# Patient Record
Sex: FEMALE
[De-identification: names, ages, dates, MRNs, and addresses within clinical notes are randomized per-mention and may not be internally consistent; named-entity substitution may affect disease eponyms.]

---

## 2019-02-02 ENCOUNTER — HOSPITAL ENCOUNTER (INPATIENT)
Dept: HOSPITAL 5 - ED | Age: 49
LOS: 3 days | Discharge: HOME HEALTH | DRG: 641 | End: 2019-02-05
Attending: INTERNAL MEDICINE | Admitting: INTERNAL MEDICINE
Payer: SELF-PAY

## 2019-02-02 DIAGNOSIS — E66.9: ICD-10-CM

## 2019-02-02 DIAGNOSIS — Z88.0: ICD-10-CM

## 2019-02-02 DIAGNOSIS — Z80.0: ICD-10-CM

## 2019-02-02 DIAGNOSIS — Z80.8: ICD-10-CM

## 2019-02-02 DIAGNOSIS — Z82.49: ICD-10-CM

## 2019-02-02 DIAGNOSIS — E87.6: ICD-10-CM

## 2019-02-02 DIAGNOSIS — Z80.1: ICD-10-CM

## 2019-02-02 DIAGNOSIS — E83.42: Primary | ICD-10-CM

## 2019-02-02 DIAGNOSIS — M62.838: ICD-10-CM

## 2019-02-02 LAB
BASOPHILS # (AUTO): 0.1 K/MM3 (ref 0–0.1)
BASOPHILS NFR BLD AUTO: 0.9 % (ref 0–1.8)
BUN SERPL-MCNC: 20 MG/DL (ref 7–17)
BUN/CREAT SERPL: 50 %
CALCIUM SERPL-MCNC: 8 MG/DL (ref 8.4–10.2)
EOSINOPHIL # BLD AUTO: 0.1 K/MM3 (ref 0–0.4)
EOSINOPHIL NFR BLD AUTO: 1.2 % (ref 0–4.3)
HCT VFR BLD CALC: 32.8 % (ref 30.3–42.9)
HEMOLYSIS INDEX: 9
HGB BLD-MCNC: 10.9 GM/DL (ref 10.1–14.3)
LYMPHOCYTES # BLD AUTO: 3.4 K/MM3 (ref 1.2–5.4)
LYMPHOCYTES NFR BLD AUTO: 39.6 % (ref 13.4–35)
MCHC RBC AUTO-ENTMCNC: 34 % (ref 30–34)
MCV RBC AUTO: 86 FL (ref 79–97)
MONOCYTES # (AUTO): 0.8 K/MM3 (ref 0–0.8)
MONOCYTES % (AUTO): 8.8 % (ref 0–7.3)
PLATELET # BLD: 314 K/MM3 (ref 140–440)
RBC # BLD AUTO: 3.81 M/MM3 (ref 3.65–5.03)

## 2019-02-02 PROCEDURE — 83735 ASSAY OF MAGNESIUM: CPT

## 2019-02-02 PROCEDURE — 36415 COLL VENOUS BLD VENIPUNCTURE: CPT

## 2019-02-02 PROCEDURE — 93005 ELECTROCARDIOGRAM TRACING: CPT

## 2019-02-02 PROCEDURE — 82550 ASSAY OF CK (CPK): CPT

## 2019-02-02 PROCEDURE — 70450 CT HEAD/BRAIN W/O DYE: CPT

## 2019-02-02 PROCEDURE — 93010 ELECTROCARDIOGRAM REPORT: CPT

## 2019-02-02 PROCEDURE — A9577 INJ MULTIHANCE: HCPCS

## 2019-02-02 PROCEDURE — 70553 MRI BRAIN STEM W/O & W/DYE: CPT

## 2019-02-02 PROCEDURE — 80048 BASIC METABOLIC PNL TOTAL CA: CPT

## 2019-02-02 PROCEDURE — 85025 COMPLETE CBC W/AUTO DIFF WBC: CPT

## 2019-02-02 RX ADMIN — MORPHINE SULFATE PRN MG: 2 INJECTION, SOLUTION INTRAMUSCULAR; INTRAVENOUS at 10:42

## 2019-02-02 RX ADMIN — Medication SCH ML: at 22:32

## 2019-02-02 RX ADMIN — MORPHINE SULFATE PRN MG: 2 INJECTION, SOLUTION INTRAMUSCULAR; INTRAVENOUS at 17:21

## 2019-02-02 NOTE — HISTORY AND PHYSICAL REPORT
History of Present Illness


Date of examination: 02/02/19


Date of admission: 


02/02/19 08:57





Chief complaint: 


painful muscle spasms





History of present illness: 


Patient is 49 yo obese. She presents with severe spasms. she states she has had 

episodes for more than 1 year. Initially spasms started on right lower ext then 

to left lower extremity now to right upper extremity. She describes pain as very

shap, 10 out of 10. This has been going on and off for more than 1 year and her 

PCP, Dr. De Leon had referred her to see neurologist but appointment not due 

yet. She denies any weakness. she denies headache. She was evaluated in 

Emergency Dept. a CT head was done, was unremarkable. Will admit for further 

evaluation.








Past History


Past Medical History: other (Attention deficit disorder)


Past Surgical History: No surgical history


Social history: , lives with family.  denies: smoking, alcohol abuse


Family history: cancer (Father has lung cancer, MI.   mother colon ca. brother 

has cancer)





Medications and Allergies


                                    Allergies











Allergy/AdvReac Type Severity Reaction Status Date / Time


 


Penicillins Allergy  Anaphylaxis Verified 02/02/19 03:31











                                Home Medications











 Medication  Instructions  Recorded  Confirmed  Last Taken  Type


 


HYDROcodone/APAP 5-325 [[place] 1 each PO Q6HR PRN #12 tablet 02/02/19  Unknown Rx





5/325]     











Active Meds: 


Active Medications





Morphine Sulfate (Morphine)  2 mg IV Q4H PRN


   PRN Reason: Pain, Moderate (4-6)


   Last Admin: 02/02/19 10:42 Dose:  2 mg


   Documented by: 











Review of Systems


All systems: negative (No fever, no cough, No aabdominal pain, no weakness. All 

other systems reviewed and are negative)





Exam





- Physical Exam


Narrative exam: 


GEN: Not in acute distress, lying in bed, obese


HEENT: Normocephalic, atraumatic, 


Neck: supple, No JVD


Lungs: Clear to auscultation bilaterally, no wheeze


Heart:S1 and S2 regular, no murmurs, rubs or gallop,  


Abd:soft, non tender, non distended, normal bowel sounds


Ext: No edema, no clubbing or cyanosis


Neuro: Awake,alert, oriented x 3, moves all ext, muscle power 5/5











- Constitutional


Vitals: 


                                        











Temp Pulse Resp BP Pulse Ox


 


 97.9 F   71   10 L  100/53   97 


 


 02/02/19 03:49  02/02/19 07:05  02/02/19 07:05  02/02/19 07:05  02/02/19 06:00














Results





- Labs


CBC & Chem 7: 


                                 02/03/19 03:10





                                 02/04/19 05:37


Labs: 


                              Abnormal lab results











  02/02/19 02/02/19 Range/Units





  04:04 04:04 


 


Lymph % (Auto)  39.6 H   (13.4-35.0)  %


 


Mono % (Auto)  8.8 H   (0.0-7.3)  %


 


Potassium   3.1 L  (3.6-5.0)  mmol/L


 


Chloride   111.1 H  ()  mmol/L


 


Carbon Dioxide   19 L  (22-30)  mmol/L


 


BUN   20 H  (7-17)  mg/dL


 


Creatinine   0.4 L  (0.7-1.2)  mg/dL


 


Calcium   8.0 L  (8.4-10.2)  mg/dL


 


Magnesium   1.50 L  (1.7-2.3)  mg/dL














Assessment and Plan


Painful muscle spasms involving all extremities.


This has become worse past few days


Admit to med/surg


Obtain MRI Brain 


Neurochecks





History of ADD


was on adderall





Full code status

## 2019-02-02 NOTE — CAT SCAN REPORT
FINAL REPORT



EXAM:  CT HEAD/BRAIN WO CON



HISTORY:  dizziness 



TECHNIQUE:  Routine axial imaging was obtained of the brain without IV contrast. There are no previou
s studies available for comparison.



FINDINGS:  

There is no evidence of acute stroke or hemorrhage. The ventricular system is appropriate in size and
 is symmetric. The basal cisterns appear normal. The mastoid air cells are well pneumatized. The visu
alized sinuses are clear. The calvarium appears intact.



IMPRESSION:  

No acute intracranial process.

## 2019-02-02 NOTE — CONSULTATION
HISTORY OF PRESENT ILLNESS:  This is a 48-year-old female that presents to

[facility] with new onset of focal spasms of legs with

associated severe pain during which time she is conscious, did have the

opportunity to review over her video of her episode, which she is in intense

pain, verbalizing the severity of the pain and she had spasms of arms and legs,

which appeared to be almost tetanic and tonic and she is yelling during this. 

She cannot stand or walk, does not affect her speech, seems go from right to

left arm and right to left leg.  She does not have bowel or bladder

incontinence.



PAST MEDICAL HISTORY:  Unremarkable for prior workup, although her family

medical doctor did treat her Zanaflex with minimal response.



PHYSICAL EXAMINATION:

VITAL SIGNS:  The patient at this point is completely alert.  She seems in some

degree of distress.  She is hyperventilating slightly.  She is obviously very

nervous and agitated.

NEUROLOGIC:  Cranial nerves are intact.  Speech is otherwise clear.  

strength is equal.  No seizure activity is noted, although she does seem to have

increased rigidity of the right arm.  Cranial nerves are intact.



IMPRESSION:  Based on the description and going over the cell phone video this

appears to be a tetanic spasm, which a course can occur in a variety of

conditions, I do not think ALS is a tenable diagnosis, MS is to be ruled out

given the patient's stated age of 48 the fact she is a female.



PLAN:  To further assess the condition.  She will need MRI scan of the spine and

brain.





DD: 02/02/2019 11:01

DT: 02/02/2019 11:27

JOB# [id number]  [id number]

JAHAIRA/EUNICE

## 2019-02-02 NOTE — EMERGENCY DEPARTMENT REPORT
HPI





- General


Chief Complaint: Neck Pain/Injury


Time Seen by Provider: 02/02/19 03:36





- HPI


HPI: 





48-year-old  female presents to the emergency department from home with

complaint of pain to the bilateral legs, the right arm and the neck consistent 

with severe muscle spasms.  The patient says that she has been dealing with this

over the past 1.5 years.  However she says that it started off just in one leg 

and has progressively worsened.  Today the pain started in the leg, then went 

upper back, and now has caused her right upper extremity to be contracted and 

spasmodic.  The patient is due to see a neurologist on 2/18/19, sent by her PCP 

Dr. De Leon, for further evaluation of these symptoms and the patient says also 

to be tested for ALS.  She has a past medical history of ADD for which he takes 

daily Adderall. She took a Zanaflex earlier for her symptoms without any relief.







ED Past Medical Hx





- Past Medical History


Previous Medical History?: Yes


Additional medical history: leg cramping





- Surgical History


Past Surgical History?: Yes


Additional Surgical History: Knee , hysterectomy





- Social History


Smoking Status: Never Smoker


Substance Use Type: None





- Medications


Home Medications: 


                                Home Medications











 Medication  Instructions  Recorded  Confirmed  Last Taken  Type


 


HYDROcodone/APAP 5-325 [[place] 1 each PO Q6HR PRN #12 tablet 02/02/19  Unknown Rx





5/325]     














ED Review of Systems


ROS: 


Stated complaint: NEURO SYMPTOMS


Other details as noted in HPI





Comment: All other systems reviewed and negative


Constitutional: denies: chills, fever


Eyes: denies: eye pain, vision change


ENT: denies: ear pain, throat pain


Respiratory: denies: cough, shortness of breath


Cardiovascular: denies: chest pain, palpitations


Gastrointestinal: denies: nausea, vomiting


Genitourinary: denies: dysuria, discharge


Musculoskeletal: arthralgia, myalgia


Skin: denies: rash, lesions


Neurological: denies: weakness, numbness





Physical Exam





- Physical Exam


Vital Signs: 


                                   Vital Signs











  02/02/19





  03:27


 


Temperature 98.1 F


 


Pulse Rate 87


 


Respiratory 20





Rate 


 


Blood Pressure 147/99


 


O2 Sat by Pulse 100





Oximetry 











Physical Exam: 





GENERAL: Patient presents yelling in pain.


HEENT: Normocephalic.  Atraumatic.   Patient has moist mucous membranes.


EYES:  Extraocular motions are intact.  Pupils are equal and reactive to light 

bilaterally.


NECK: Supple.  Trachea is midline.  Patient has what appears to be a spasmodic 

left-sided sidebend to the neck.


CHEST/LUNGS: Clear to auscultation.  There is no respiratory distress noted.  


HEART/CARDIOVASCULAR: Regular.  There is no tachycardia.  There is no obvious 

murmur.


ABDOMEN: Abdomen is soft, nontender.  Patient has normal bowel sounds.  There is

no abdominal distention.


SKIN: Skin is warm and dry.


NEURO: The patient is awake, alert, and oriented.  The patient is cooperative.  

The patient has no focal neurologic deficits.  The patient has normal speech.


MUSCULOSKELETAL: The patient's right upper extremity is spasming and contracted 

up against her chest and she is unable to relax the extremity.  Radial pulse +2 

over 4 and capillary refill less than 2 seconds to the affected right upper 

extremity.  





ED Course


                                   Vital Signs











  02/02/19





  03:27


 


Temperature 98.1 F


 


Pulse Rate 87


 


Respiratory 20





Rate 


 


Blood Pressure 147/99


 


O2 Sat by Pulse 100





Oximetry 














ED Medical Decision Making





- Lab Data


Result diagrams: 


                                 02/02/19 04:04





                                 02/02/19 04:04





- EKG Data


-: EKG Interpreted by Me


EKG shows normal: sinus rhythm, axis, intervals, QRS complexes, ST-T waves


Rate: normal





- EKG Data


When compared to previous EKG there are: previous EKG unavailable


Interpretation: normal EKG





- Radiology Data


Radiology results: report reviewed





EXAM: CT HEAD/BRAIN WO CON 





HISTORY: dizziness 





TECHNIQUE: Routine axial imaging was obtained of the brain without IV contrast. 

There are no 


previous studies available for comparison. 





FINDINGS: 


There is no evidence of acute stroke or hemorrhage. The ventricular system is 

appropriate in size 


and is symmetric. The basal cisterns appear normal. The mastoid air cells are 

well pneumatized. The 


visualized sinuses are clear. The calvarium appears intact. 





IMPRESSION: 


No acute intracranial process. 











Transcribed By: RB 


Dictated By: HERACLIO CALVO MD 


Electronically Authenticated By: HERACLIO CALVO MD 


Signed Date/Time: 02/02/19 0754 








- Medical Decision Making





This patient presents with a complaint of spasmodic pain that started in the 

legs and lower back and then moved up to the right arm and her neck.  She 

presents yelling in pain with the right arm contracted up against the chest and 

the neck and a left-sided sidebend.  An IV was placed and the patient was given 

some IV fluid resuscitation and pain medication.  This allowed her to finally 

relax and the contracture and neck sidebend resolved.  Patient's labs showed 

hypokalemia and hypomagnesemia and both of these electrolytes were replaced.  

Patient was evaluated multiple times over multiple hours and did appear to be 

improving.  However in my attempt to ambulate the patient, she complained of 

feeling unstable and having some blurry vision.  At this point a CT scan of the 

head without contrast was done that did not show any signs of any acute bleed, 

shift, mass, ischemia, or any other acute process.  Given her ED course and 

these current symptoms, the patient will be admitted to the hospital for further

evaluation and was accepted for admission by the hospitalist service.





- Differential Diagnosis


muscle spasm, tetany, MS, fibromyalgia


Critical Care Time: No


Critical care attestation.: 


If time is entered above; I have spent that time in minutes in the direct care 

of this critically ill patient, excluding procedure time.








ED Disposition


Clinical Impression: 


 Muscle spasm, Muscle spasticity, Musculoskeletal pain, Hypokalemia, H

ypomagnesemia, Unstable gait, Blurred vision





Disposition: DC-09 OP ADMIT IP TO THIS HOSP


Is pt being admited?: No


Condition: Stable


Time of Disposition: 06:53

## 2019-02-02 NOTE — PROGRESS NOTE
Subjective


Date of service: 02/02/19


Interval history: 


seen in the ED slurred speech and unsteady gait  severe leg wekness,,,, plan 

check MRI  reviewed all labs and wenrt over hx








Objective





- Vital Sign


                               Vital Signs - 12hr











  02/02/19 02/02/19 02/02/19





  03:27 03:49 04:00


 


Temperature 98.1 F 97.9 F 


 


Pulse Rate 87 80 


 


Respiratory 20 20 





Rate   


 


Blood Pressure 147/99  113/66


 


Blood Pressure  148/82 





[Left]   


 


O2 Sat by Pulse 100 99 98





Oximetry   














  02/02/19 02/02/19 02/02/19





  05:00 06:00 07:05


 


Temperature   


 


Pulse Rate   71


 


Respiratory   10 L





Rate   


 


Blood Pressure 123/62 100/53 100/53


 


Blood Pressure   





[Left]   


 


O2 Sat by Pulse 97 97 





Oximetry   














- Laboratory Findings


CBC and BMP: 


                                 02/02/19 04:04





                                 02/02/19 04:04


Abnormal Lab Findings: 


                                  Abnormal Labs











  02/02/19 02/02/19





  04:04 04:04


 


Lymph % (Auto)  39.6 H 


 


Mono % (Auto)  8.8 H 


 


Potassium   3.1 L


 


Chloride   111.1 H


 


Carbon Dioxide   19 L


 


BUN   20 H


 


Creatinine   0.4 L


 


Calcium   8.0 L


 


Magnesium   1.50 L

## 2019-02-03 LAB
BASOPHILS # (AUTO): 0 K/MM3 (ref 0–0.1)
BASOPHILS NFR BLD AUTO: 0.4 % (ref 0–1.8)
BUN SERPL-MCNC: 18 MG/DL (ref 7–17)
BUN/CREAT SERPL: 36 %
CALCIUM SERPL-MCNC: 8.6 MG/DL (ref 8.4–10.2)
EOSINOPHIL # BLD AUTO: 0.2 K/MM3 (ref 0–0.4)
EOSINOPHIL NFR BLD AUTO: 2.7 % (ref 0–4.3)
HCT VFR BLD CALC: 34 % (ref 30.3–42.9)
HEMOLYSIS INDEX: 6
HGB BLD-MCNC: 11.4 GM/DL (ref 10.1–14.3)
LYMPHOCYTES # BLD AUTO: 2.6 K/MM3 (ref 1.2–5.4)
LYMPHOCYTES NFR BLD AUTO: 32.5 % (ref 13.4–35)
MCHC RBC AUTO-ENTMCNC: 34 % (ref 30–34)
MCV RBC AUTO: 86 FL (ref 79–97)
MONOCYTES # (AUTO): 0.6 K/MM3 (ref 0–0.8)
MONOCYTES % (AUTO): 7.9 % (ref 0–7.3)
PLATELET # BLD: 296 K/MM3 (ref 140–440)
RBC # BLD AUTO: 3.97 M/MM3 (ref 3.65–5.03)

## 2019-02-03 RX ADMIN — MORPHINE SULFATE PRN MG: 2 INJECTION, SOLUTION INTRAMUSCULAR; INTRAVENOUS at 09:40

## 2019-02-03 RX ADMIN — MORPHINE SULFATE PRN MG: 2 INJECTION, SOLUTION INTRAMUSCULAR; INTRAVENOUS at 06:24

## 2019-02-03 RX ADMIN — TIZANIDINE PRN MG: 4 TABLET ORAL at 10:48

## 2019-02-03 RX ADMIN — TIZANIDINE PRN MG: 4 TABLET ORAL at 19:16

## 2019-02-03 RX ADMIN — MORPHINE SULFATE PRN MG: 2 INJECTION, SOLUTION INTRAMUSCULAR; INTRAVENOUS at 14:25

## 2019-02-03 RX ADMIN — MORPHINE SULFATE PRN MG: 2 INJECTION, SOLUTION INTRAMUSCULAR; INTRAVENOUS at 21:47

## 2019-02-03 RX ADMIN — Medication SCH ML: at 10:47

## 2019-02-03 RX ADMIN — Medication SCH ML: at 21:46

## 2019-02-03 NOTE — PROGRESS NOTE
Subjective


Date of service: 02/03/19


Interval history: 


PATIENT SEEN AND i THOROUGHLY REVIEWED THE CELL PHONE VIDEO... THIS COULD BE MS 

OR ELECTROLYTE DISORDER- POTASSIUM AND MAGBNESIUM  mri OF THE SPINE BEING 

REVIEWED





SPOKE TO DR. KUMAR PERSONALLY








Objective





- Vital Sign


                               Vital Signs - 12hr











  02/02/19 02/03/19





  21:55 05:16


 


Temperature 98.4 F 98.6 F


 


Pulse Rate 83 77


 


Respiratory 20 20





Rate  


 


Blood Pressure 106/56 145/74


 


O2 Sat by Pulse 92 96





Oximetry  














- Laboratory Findings


CBC and BMP: 


                                 02/03/19 03:10





                                 02/03/19 03:10


Abnormal Lab Findings: 


                                  Abnormal Labs











  02/02/19 02/02/19 02/03/19





  04:04 04:04 03:10


 


Lymph % (Auto)  39.6 H  


 


Mono % (Auto)  8.8 H   7.9 H


 


Potassium   3.1 L 


 


Chloride   111.1 H 


 


Carbon Dioxide   19 L 


 


BUN   20 H 


 


Creatinine   0.4 L 


 


Glucose   


 


Calcium   8.0 L 


 


Magnesium   1.50 L 














  02/03/19





  03:10


 


Lymph % (Auto) 


 


Mono % (Auto) 


 


Potassium 


 


Chloride 


 


Carbon Dioxide 


 


BUN  18 H


 


Creatinine  0.5 L


 


Glucose  119 H


 


Calcium 


 


Magnesium

## 2019-02-03 NOTE — PROGRESS NOTE
Assessment and Plan


Assessment and plan: 


Painful muscle spasms involving all extremities.


This has become worse past few days


Admitted to med/surg


Awaiting MRI Brain 


Neurochecks


patient was evaluated by neurologist





History of ADD


was on adderall





Full code status








History


Interval history: 


Painful muscle spasms








Hospitalist Physical





- Physical exam


Narrative exam: 


GEN: Not in acute distress, lying in bed, obese


HEENT: Normocephalic, atraumatic, 


Neck: supple, No JVD


Lungs: Clear to auscultation bilaterally, no wheeze


Heart:S1 and S2 regular, no murmurs, rubs or gallop,  


Abd:soft, non tender, non distended, normal bowel sounds


Ext: No edema, no clubbing or cyanosis


Neuro: Awake,alert, oriented x 3, moves all ext, muscle power 5/5











- Constitutional


Vitals: 


                                        











Temp Pulse Resp BP Pulse Ox


 


 98.6 F   77   20   145/74   96 


 


 02/03/19 05:16  02/03/19 05:16  02/03/19 05:16  02/03/19 05:16  02/03/19 05:16














Results





- Labs


CBC & Chem 7: 


                                 02/03/19 03:10





                                 02/04/19 05:37


Labs: 


                             Laboratory Last Values











WBC  7.9 K/mm3 (4.5-11.0)   02/03/19  03:10    


 


RBC  3.97 M/mm3 (3.65-5.03)   02/03/19  03:10    


 


Hgb  11.4 gm/dl (10.1-14.3)   02/03/19  03:10    


 


Hct  34.0 % (30.3-42.9)   02/03/19  03:10    


 


MCV  86 fl (79-97)   02/03/19  03:10    


 


MCH  29 pg (28-32)   02/03/19  03:10    


 


MCHC  34 % (30-34)   02/03/19  03:10    


 


RDW  14.5 % (13.2-15.2)   02/03/19  03:10    


 


Plt Count  296 K/mm3 (140-440)   02/03/19  03:10    


 


Lymph % (Auto)  32.5 % (13.4-35.0)   02/03/19  03:10    


 


Mono % (Auto)  7.9 % (0.0-7.3)  H  02/03/19  03:10    


 


Eos % (Auto)  2.7 % (0.0-4.3)   02/03/19  03:10    


 


Baso % (Auto)  0.4 % (0.0-1.8)   02/03/19  03:10    


 


Lymph #  2.6 K/mm3 (1.2-5.4)   02/03/19  03:10    


 


Mono #  0.6 K/mm3 (0.0-0.8)   02/03/19  03:10    


 


Eos #  0.2 K/mm3 (0.0-0.4)   02/03/19  03:10    


 


Baso #  0.0 K/mm3 (0.0-0.1)   02/03/19  03:10    


 


Seg Neutrophils %  56.5 % (40.0-70.0)   02/03/19  03:10    


 


Seg Neutrophils #  4.5 K/mm3 (1.8-7.7)   02/03/19  03:10    


 


Sodium  138 mmol/L (137-145)   02/03/19  03:10    


 


Potassium  4.1 mmol/L (3.6-5.0)  D 02/03/19  03:10    


 


Chloride  104.7 mmol/L ()   02/03/19  03:10    


 


Carbon Dioxide  22 mmol/L (22-30)   02/03/19  03:10    


 


Anion Gap  15 mmol/L  02/03/19  03:10    


 


BUN  18 mg/dL (7-17)  H  02/03/19  03:10    


 


Creatinine  0.5 mg/dL (0.7-1.2)  L  02/03/19  03:10    


 


Estimated GFR  > 60 ml/min  02/03/19  03:10    


 


BUN/Creatinine Ratio  36 %  02/03/19  03:10    


 


Glucose  119 mg/dL ()  H  02/03/19  03:10    


 


Calcium  8.6 mg/dL (8.4-10.2)   02/03/19  03:10    


 


Magnesium  1.50 mg/dL (1.7-2.3)  L  02/02/19  04:04    


 


Total Creatine Kinase  69 units/L ()   02/02/19  04:04

## 2019-02-04 LAB
BUN SERPL-MCNC: 13 MG/DL (ref 7–17)
BUN/CREAT SERPL: 33 %
CALCIUM SERPL-MCNC: 9.5 MG/DL (ref 8.4–10.2)
HEMOLYSIS INDEX: 1

## 2019-02-04 RX ADMIN — MORPHINE SULFATE PRN MG: 2 INJECTION, SOLUTION INTRAMUSCULAR; INTRAVENOUS at 05:04

## 2019-02-04 RX ADMIN — MORPHINE SULFATE PRN MG: 2 INJECTION, SOLUTION INTRAMUSCULAR; INTRAVENOUS at 09:10

## 2019-02-04 RX ADMIN — Medication SCH ML: at 22:46

## 2019-02-04 RX ADMIN — TIZANIDINE PRN MG: 4 TABLET ORAL at 16:12

## 2019-02-04 RX ADMIN — MORPHINE SULFATE PRN MG: 2 INJECTION, SOLUTION INTRAMUSCULAR; INTRAVENOUS at 15:00

## 2019-02-04 RX ADMIN — ENOXAPARIN SODIUM SCH MG: 100 INJECTION SUBCUTANEOUS at 09:11

## 2019-02-04 RX ADMIN — MORPHINE SULFATE PRN MG: 2 INJECTION, SOLUTION INTRAMUSCULAR; INTRAVENOUS at 23:12

## 2019-02-04 RX ADMIN — Medication SCH ML: at 09:11

## 2019-02-04 NOTE — PROGRESS NOTE
Subjective


Date of service: 02/04/19


Interval history: 


my first view of the MRI is very unremasrkable  see no clear evidence of MS 

suspect mag and potassium responsible








Objective





- Vital Sign


                               Vital Signs - 12hr











  02/04/19 02/04/19





  05:09 12:35


 


Temperature 97.7 F 98.0 F


 


Pulse Rate 79 81


 


Respiratory 18 18





Rate  


 


Blood Pressure 133/86 115/71


 


O2 Sat by Pulse 94 95





Oximetry  














- Laboratory Findings


CBC and BMP: 


                                 02/03/19 03:10





                                 02/04/19 05:37


Abnormal Lab Findings: 


                                  Abnormal Labs











  02/02/19 02/02/19 02/03/19





  04:04 04:04 03:10


 


Lymph % (Auto)  39.6 H  


 


Mono % (Auto)  8.8 H   7.9 H


 


Potassium   3.1 L 


 


Chloride   111.1 H 


 


Carbon Dioxide   19 L 


 


BUN   20 H 


 


Creatinine   0.4 L 


 


Glucose   


 


Calcium   8.0 L 


 


Magnesium   1.50 L 














  02/03/19 02/04/19





  03:10 05:37


 


Lymph % (Auto)  


 


Mono % (Auto)  


 


Potassium  


 


Chloride  


 


Carbon Dioxide  


 


BUN  18 H 


 


Creatinine  0.5 L  0.4 L


 


Glucose  119 H 


 


Calcium  


 


Magnesium

## 2019-02-04 NOTE — MAGNETIC RESONANCE REPORT
FINAL REPORT



PROCEDURE:  MRI brain without and with contrast. 



TECHNIQUE:  Magnetic resonance imaging of the brain was performed before and after the IV injection o
f paramagnetic contrast. 



HISTORY:  Unsteady gait, spasms. 



COMPARISON:  No prior studies are available for comparison.



FINDINGS:  

The ventricles are normal in size. There is normal signal intensity from the gray matter and white ma
tter. There are no mass lesions. There is no intracranial hemorrhage. There are no signs of restricte
d diffusion. There are no areas of abnormal contrast enhancement. The mastoid air cells and paranasal
 sinuses are well aerated. 



IMPRESSION:  

Normal study.

## 2019-02-04 NOTE — PROGRESS NOTE
Assessment and Plan


Assessment and plan: 


Painful muscle spasms involving all extremities.


This has become worse past few day


MRI Brain normal


Neurochecks


PT eval ordered.





History of ADD


was on adderall





Full code status





Likely dc home tomorrow after PT eval to follow with her PCP ansd Neuro 

outpatient.














History


Interval history: 


Still having painful muscle spasms








Hospitalist Physical





- Physical exam


Narrative exam: 


GEN: Not in acute distress, lying in bed, obese


HEENT: Normocephalic, atraumatic, 


Neck: supple, No JVD


Lungs: Clear to auscultation bilaterally, no wheeze


Heart:S1 and S2 regular, no murmurs, rubs or gallop,  


Abd:soft, non tender, non distended, normal bowel sounds


Ext: No edema, no clubbing or cyanosis


Neuro: Awake,alert, oriented x 3, moves all ext, muscle power 5/5











- Constitutional


Vitals: 


                                        











Temp Pulse Resp BP Pulse Ox


 


 98.0 F   81   18   115/71   95 


 


 02/04/19 12:35  02/04/19 12:35  02/04/19 12:35  02/04/19 12:35  02/04/19 12:35














Results





- Labs


CBC & Chem 7: 


                                 02/03/19 03:10





                                 02/04/19 05:37


Labs: 


                             Laboratory Last Values











WBC  7.9 K/mm3 (4.5-11.0)   02/03/19  03:10    


 


RBC  3.97 M/mm3 (3.65-5.03)   02/03/19  03:10    


 


Hgb  11.4 gm/dl (10.1-14.3)   02/03/19  03:10    


 


Hct  34.0 % (30.3-42.9)   02/03/19  03:10    


 


MCV  86 fl (79-97)   02/03/19  03:10    


 


MCH  29 pg (28-32)   02/03/19  03:10    


 


MCHC  34 % (30-34)   02/03/19  03:10    


 


RDW  14.5 % (13.2-15.2)   02/03/19  03:10    


 


Plt Count  296 K/mm3 (140-440)   02/03/19  03:10    


 


Lymph % (Auto)  32.5 % (13.4-35.0)   02/03/19  03:10    


 


Mono % (Auto)  7.9 % (0.0-7.3)  H  02/03/19  03:10    


 


Eos % (Auto)  2.7 % (0.0-4.3)   02/03/19  03:10    


 


Baso % (Auto)  0.4 % (0.0-1.8)   02/03/19  03:10    


 


Lymph #  2.6 K/mm3 (1.2-5.4)   02/03/19  03:10    


 


Mono #  0.6 K/mm3 (0.0-0.8)   02/03/19  03:10    


 


Eos #  0.2 K/mm3 (0.0-0.4)   02/03/19  03:10    


 


Baso #  0.0 K/mm3 (0.0-0.1)   02/03/19  03:10    


 


Seg Neutrophils %  56.5 % (40.0-70.0)   02/03/19  03:10    


 


Seg Neutrophils #  4.5 K/mm3 (1.8-7.7)   02/03/19  03:10    


 


Sodium  140 mmol/L (137-145)   02/04/19  05:37    


 


Potassium  3.9 mmol/L (3.6-5.0)   02/04/19  05:37    


 


Chloride  104.4 mmol/L ()   02/04/19  05:37    


 


Carbon Dioxide  25 mmol/L (22-30)   02/04/19  05:37    


 


Anion Gap  15 mmol/L  02/04/19  05:37    


 


BUN  13 mg/dL (7-17)   02/04/19  05:37    


 


Creatinine  0.4 mg/dL (0.7-1.2)  L  02/04/19  05:37    


 


Estimated GFR  > 60 ml/min  02/04/19  05:37    


 


BUN/Creatinine Ratio  33 %  02/04/19  05:37    


 


Glucose  98 mg/dL ()   02/04/19  05:37    


 


Calcium  9.5 mg/dL (8.4-10.2)   02/04/19  05:37    


 


Magnesium  1.70 mg/dL (1.7-2.3)   02/04/19  05:37    


 


Total Creatine Kinase  69 units/L ()   02/02/19  04:04

## 2019-02-05 VITALS — DIASTOLIC BLOOD PRESSURE: 86 MMHG | SYSTOLIC BLOOD PRESSURE: 142 MMHG

## 2019-02-05 RX ADMIN — MORPHINE SULFATE PRN MG: 2 INJECTION, SOLUTION INTRAMUSCULAR; INTRAVENOUS at 10:09

## 2019-02-05 RX ADMIN — ENOXAPARIN SODIUM SCH MG: 100 INJECTION SUBCUTANEOUS at 10:08

## 2019-02-05 RX ADMIN — TIZANIDINE PRN MG: 4 TABLET ORAL at 05:53

## 2019-02-05 NOTE — PROGRESS NOTE
Subjective


Date of service: 02/05/19


Interval history: 


OK TO DISCHARGE AND FOLLOW UP IN OFFICE LIKELY DX IS LOW k AND mAG  ADVISE 

ELECTROLYTE SUPPLEMENTS








Objective





- Vital Sign


                               Vital Signs - 12hr











  02/05/19





  05:07


 


Temperature 97.8 F


 


Pulse Rate 79


 


Respiratory 20





Rate 


 


Blood Pressure 142/86


 


O2 Sat by Pulse 96





Oximetry 














- Laboratory Findings


CBC and BMP: 


                                 02/03/19 03:10





                                 02/04/19 05:37


Abnormal Lab Findings: 


                                  Abnormal Labs











  02/02/19 02/02/19 02/03/19





  04:04 04:04 03:10


 


Lymph % (Auto)  39.6 H  


 


Mono % (Auto)  8.8 H   7.9 H


 


Potassium   3.1 L 


 


Chloride   111.1 H 


 


Carbon Dioxide   19 L 


 


BUN   20 H 


 


Creatinine   0.4 L 


 


Glucose   


 


Calcium   8.0 L 


 


Magnesium   1.50 L 














  02/03/19 02/04/19





  03:10 05:37


 


Lymph % (Auto)  


 


Mono % (Auto)  


 


Potassium  


 


Chloride  


 


Carbon Dioxide  


 


BUN  18 H 


 


Creatinine  0.5 L  0.4 L


 


Glucose  119 H 


 


Calcium  


 


Magnesium

## 2019-02-05 NOTE — DISCHARGE SUMMARY
Providers





- Providers


Date of Admission: 


02/02/19 08:57





Date of discharge: 02/05/19


Attending physician: 


ELIANA MORRIS





                                        





02/02/19 10:16


Consult to Physician [CONS] Routine 


   Comment: 


   Consulting Provider: BERRY VALDEZ


   Physician Instructions: 


   Reason For Exam: Unsteady gait, blurred vision





02/04/19 12:08


Occupational Therapy Evaluate and Treat [CONS] Routine 


   Comment: 


   Reason For Exam: generalized weakness


Physical Therapy Evaluation and Treat [CONS] Routine 


   Comment: 


   Reason For Exam: generalized weakness











Primary care physician: 


BERRY FINN








Hospitalization


Reason for admission: muscle spasm


Condition: Stable


Hospital course: 





Patient is 47 yo obese female who presented with severe spasms occurring for 

more than 1 year. Initially spasms started on right lower ext then to left lower

 extremity now to right upper extremity. She describeed pain as very shap, 10 

out of 10. Her PCP, Dr. Drake had referred her to see neurologist but 

appointment not due yet. She denied any weakness. she denies headache. She was 

evaluated in Emergency Dept. a CT head was done, was unremarkable.  The patient 

was admitted for further evaluation.  The patient underwent MRI that was found 

to be unremarkable with no evidence of emesis.  The patient was seen by 

neurology consultation who felt that the etiology was secondary to hypokalemia 

and hypomagnesemia.  The electrolytes were repleted.  Pt is felt to have 

received maximal hospital benefit and d/c home.  Discharge time 32 minutes.


Disposition: DC-01 TO HOME OR SELFCARE


Time spent for discharge: 32





- Discharge Diagnoses


(1) Hypokalemia


Status: Acute   





(2) Hypomagnesemia


Status: Acute   





(3) Muscle spasm


Status: Acute   





(4) Muscle spasticity


Status: Acute   





(5) Musculoskeletal pain


Status: Acute   





Core Measure Documentation





- Palliative Care


Palliative Care/ Comfort Measures: Not Applicable





- Core Measures


Any of the following diagnoses?: none





Exam





- Constitutional


Vitals: 


                                        











Temp Pulse Resp BP Pulse Ox


 


 97.8 F   79   20   142/86   96 


 


 02/05/19 05:07  02/05/19 05:07  02/05/19 05:07  02/05/19 05:07  02/05/19 05:07











General appearance: Present: no acute distress, well-nourished





- EENT


Eyes: Present: PERRL


ENT: hearing intact, clear oral mucosa





- Neck


Neck: Present: supple, normal ROM





- Respiratory


Respiratory effort: normal


Respiratory: bilateral: CTA





- Cardiovascular


Heart Sounds: Present: S1 & S2.  Absent: rub, click





- Extremities


Extremities: pulses symmetrical, No edema


Peripheral Pulses: within normal limits





- Abdominal


General gastrointestinal: Present: soft, non-tender, non-distended, normal bowel

 sounds


Female genitourinary: Present: normal





- Integumentary


Integumentary: Present: clear, warm, dry





- Musculoskeletal


Musculoskeletal: gait normal, strength equal bilaterally





- Psychiatric


Psychiatric: appropriate mood/affect, intact judgment & insight





- Neurologic


Neurologic: CNII-XII intact, moves all extremities





Plan


Activity: no restrictions


Weight Bearing Status: Full Weight Bearing


Diet: regular


Follow up with: 


GUME DRAKE JR, MD [Staff Physician] - ASAP


Prescriptions: 


HYDROcodone/APAP 5-325 [[place] 5/325] 1 each PO Q6HR PRN #12 tablet


 PRN Reason: Pain


tiZANidine [Zanaflex] 4 mg PO Q8H PRN #20 tablet


 PRN Reason: Muscle Spasm

## 2022-02-17 ENCOUNTER — WEB ENCOUNTER (OUTPATIENT)
Dept: URBAN - METROPOLITAN AREA CLINIC 109 | Facility: CLINIC | Age: 52
End: 2022-02-17

## 2022-02-17 ENCOUNTER — DASHBOARD ENCOUNTERS (OUTPATIENT)
Age: 52
End: 2022-02-17

## 2022-02-17 ENCOUNTER — LAB OUTSIDE AN ENCOUNTER (OUTPATIENT)
Dept: URBAN - METROPOLITAN AREA CLINIC 109 | Facility: CLINIC | Age: 52
End: 2022-02-17

## 2022-02-17 ENCOUNTER — OFFICE VISIT (OUTPATIENT)
Dept: URBAN - METROPOLITAN AREA CLINIC 109 | Facility: CLINIC | Age: 52
End: 2022-02-17
Payer: MEDICARE

## 2022-02-17 DIAGNOSIS — R13.19 ESOPHAGEAL DYSPHAGIA: ICD-10-CM

## 2022-02-17 DIAGNOSIS — R19.5 POSITIVE COLORECTAL CANCER SCREENING USING COLOGUARD TEST: ICD-10-CM

## 2022-02-17 DIAGNOSIS — M79.7 FIBROMYALGIA: ICD-10-CM

## 2022-02-17 DIAGNOSIS — F41.9 ANXIETY: ICD-10-CM

## 2022-02-17 DIAGNOSIS — F31.9 BIPOLAR 1 DISORDER: ICD-10-CM

## 2022-02-17 DIAGNOSIS — K21.9 GASTROESOPHAGEAL REFLUX DISEASE, UNSPECIFIED WHETHER ESOPHAGITIS PRESENT: ICD-10-CM

## 2022-02-17 DIAGNOSIS — Z80.0 FAMILY HISTORY OF COLON CANCER IN MOTHER: ICD-10-CM

## 2022-02-17 PROBLEM — 203082005: Status: ACTIVE | Noted: 2022-02-17

## 2022-02-17 PROBLEM — 48694002: Status: ACTIVE | Noted: 2022-02-17

## 2022-02-17 PROBLEM — 371596008: Status: ACTIVE | Noted: 2022-02-17

## 2022-02-17 PROCEDURE — 99203 OFFICE O/P NEW LOW 30 MIN: CPT | Performed by: INTERNAL MEDICINE

## 2022-02-17 RX ORDER — LAMOTRIGINE 300 MG/1
TABLET, FILM COATED, EXTENDED RELEASE ORAL
Qty: 90 | Status: ACTIVE | COMMUNITY

## 2022-02-17 RX ORDER — TRAZODONE HYDROCHLORIDE 50 MG/1
TABLET ORAL
Qty: 90 | Status: ACTIVE | COMMUNITY

## 2022-02-17 RX ORDER — PANTOPRAZOLE SODIUM 40 MG/1
1 TABLET TABLET, DELAYED RELEASE ORAL ONCE A DAY
Qty: 90 TABLET | Refills: 3 | OUTPATIENT
Start: 2022-02-17

## 2022-02-17 RX ORDER — TIZANIDINE 4 MG/1
TABLET ORAL
Qty: 90 | Status: ACTIVE | COMMUNITY

## 2022-02-17 NOTE — HPI-TODAY'S VISIT:
The patient is referred for a postive Cologuard test and a FH of colon cancer. No prior colonoscopy. Denies change in bowel habits, visible blood in the stool, abdominal pain or weight loss.  FH is notable for colon cancer in her mother at age 59. Sister had ovarian cancer at age 19 and had bilateral breast cancers.  Another sister had breast cancer.  A niece had breast cancer at age 17. Father had thyroid cancer.  Brother had thyroid, lung and bone.  The primary was either thyroid or lung.  Cousin with Hodgkins.  2 aunts had breast cancer. Patient is not aware of any genetic testing. She has chronic GERD with dysphagia and some food impaction events.  Takes only OTC meds.  PMH ntoable for anxiety, bipolar. Fibromyalgia.  RADHA/BSA for precancerous lesions of the uterus.  She has a history of a lesion on the breast 10 years ago and was told to f/u on this, but has been afraid and her last mamogram was 10 years ago.

## 2022-03-10 ENCOUNTER — TELEPHONE ENCOUNTER (OUTPATIENT)
Dept: URBAN - METROPOLITAN AREA CLINIC 109 | Facility: CLINIC | Age: 52
End: 2022-03-10

## 2022-03-21 PROBLEM — 235595009: Status: ACTIVE | Noted: 2022-02-17

## 2022-04-01 ENCOUNTER — CLAIMS CREATED FROM THE CLAIM WINDOW (OUTPATIENT)
Dept: URBAN - METROPOLITAN AREA CLINIC 4 | Facility: CLINIC | Age: 52
End: 2022-04-01
Payer: MEDICARE

## 2022-04-01 ENCOUNTER — OFFICE VISIT (OUTPATIENT)
Dept: URBAN - METROPOLITAN AREA SURGERY CENTER 23 | Facility: SURGERY CENTER | Age: 52
End: 2022-04-01
Payer: MEDICARE

## 2022-04-01 DIAGNOSIS — D12.3 BENIGN NEOPLASM OF TRANSVERSE COLON: ICD-10-CM

## 2022-04-01 DIAGNOSIS — D12.4 ADENOMA OF DESCENDING COLON: ICD-10-CM

## 2022-04-01 DIAGNOSIS — D12.4 BENIGN NEOPLASM OF DESCENDING COLON: ICD-10-CM

## 2022-04-01 DIAGNOSIS — D12.0 ADENOMA OF CECUM: ICD-10-CM

## 2022-04-01 DIAGNOSIS — D12.3 ADENOMA OF TRANSVERSE COLON: ICD-10-CM

## 2022-04-01 DIAGNOSIS — D12.0 BENIGN NEOPLASM OF CECUM: ICD-10-CM

## 2022-04-01 DIAGNOSIS — R19.5 ABNORMAL CONSISTENCY OF STOOL: ICD-10-CM

## 2022-04-01 PROCEDURE — G8907 PT DOC NO EVENTS ON DISCHARG: HCPCS | Performed by: INTERNAL MEDICINE

## 2022-04-01 PROCEDURE — 45385 COLONOSCOPY W/LESION REMOVAL: CPT | Performed by: INTERNAL MEDICINE

## 2022-04-01 PROCEDURE — 88305 TISSUE EXAM BY PATHOLOGIST: CPT | Performed by: PATHOLOGY

## 2022-04-01 RX ORDER — LAMOTRIGINE 300 MG/1
TABLET, FILM COATED, EXTENDED RELEASE ORAL
Qty: 90 | Status: ACTIVE | COMMUNITY

## 2022-04-01 RX ORDER — TRAZODONE HYDROCHLORIDE 50 MG/1
TABLET ORAL
Qty: 90 | Status: ACTIVE | COMMUNITY

## 2022-04-01 RX ORDER — PANTOPRAZOLE SODIUM 40 MG/1
1 TABLET TABLET, DELAYED RELEASE ORAL ONCE A DAY
Qty: 90 TABLET | Refills: 3 | Status: ACTIVE | COMMUNITY
Start: 2022-02-17

## 2022-04-01 RX ORDER — TIZANIDINE 4 MG/1
TABLET ORAL
Qty: 90 | Status: ACTIVE | COMMUNITY

## 2024-03-29 ENCOUNTER — OV EP (OUTPATIENT)
Dept: URBAN - METROPOLITAN AREA CLINIC 109 | Facility: CLINIC | Age: 54
End: 2024-03-29

## 2024-03-29 RX ORDER — TRAZODONE HYDROCHLORIDE 50 MG/1
TABLET ORAL
Qty: 90 | Status: ACTIVE | COMMUNITY

## 2024-03-29 RX ORDER — PANTOPRAZOLE SODIUM 40 MG/1
1 TABLET TABLET, DELAYED RELEASE ORAL ONCE A DAY
Qty: 90 TABLET | Refills: 3 | Status: ACTIVE | COMMUNITY
Start: 2022-02-17

## 2024-03-29 RX ORDER — TIZANIDINE 4 MG/1
TABLET ORAL
Qty: 90 | Status: ACTIVE | COMMUNITY

## 2024-03-29 RX ORDER — LAMOTRIGINE 300 MG/1
TABLET, FILM COATED, EXTENDED RELEASE ORAL
Qty: 90 | Status: ACTIVE | COMMUNITY